# Patient Record
Sex: MALE | Race: WHITE | HISPANIC OR LATINO
[De-identification: names, ages, dates, MRNs, and addresses within clinical notes are randomized per-mention and may not be internally consistent; named-entity substitution may affect disease eponyms.]

---

## 2019-07-10 PROBLEM — Z00.00 ENCOUNTER FOR PREVENTIVE HEALTH EXAMINATION: Status: ACTIVE | Noted: 2019-07-10

## 2019-07-15 ENCOUNTER — APPOINTMENT (OUTPATIENT)
Dept: OTOLARYNGOLOGY | Facility: CLINIC | Age: 49
End: 2019-07-15
Payer: COMMERCIAL

## 2019-07-15 DIAGNOSIS — J34.9 UNSPECIFIED DISORDER OF NOSE AND NASAL SINUSES: ICD-10-CM

## 2019-07-15 DIAGNOSIS — J32.4 CHRONIC PANSINUSITIS: ICD-10-CM

## 2019-07-15 DIAGNOSIS — H61.20 IMPACTED CERUMEN, UNSPECIFIED EAR: ICD-10-CM

## 2019-07-15 PROCEDURE — 99203 OFFICE O/P NEW LOW 30 MIN: CPT | Mod: 25

## 2019-07-15 PROCEDURE — 69210 REMOVE IMPACTED EAR WAX UNI: CPT

## 2019-07-15 PROCEDURE — 31231 NASAL ENDOSCOPY DX: CPT

## 2019-07-15 RX ORDER — PSEUDOEPHEDRINE HCL 30 MG
TABLET ORAL
Refills: 0 | Status: ACTIVE | COMMUNITY

## 2019-07-15 RX ORDER — FLUTICASONE PROPIONATE 50 MCG
SPRAY, SUSPENSION NASAL
Refills: 0 | Status: ACTIVE | COMMUNITY

## 2019-07-15 NOTE — PROCEDURE
[FreeTextEntry6] : PROCEDURE NOTES\par \par PROCEDURE: Nasal endoscopy \par SURGEON: Dr. Garcia\par INDICATIONS: Assess for chronic sinusitis. \par ANESTHESIA: The patient was placed in a sitting position.  Following application of the topical anesthetic and decongestant, exam was performed with a zero degree endoscope.  The scope was passed along the right nasal floor to the nasopharynx.  It was then passed into the region of the middle meatus, middle turbinate, and sphenoethmoid region.  An identical procedure was performed on the left side.  The following findings were noted:\par \par The nasal mucosa was healthy appearing and the septum was roughly midline. The middle meatus and sphenoethmoid recesses were clear bilaterally. The nasopharynx was normal. \par  \par scant mucopus--  cultured in right middle meatus.

## 2019-07-15 NOTE — HISTORY OF PRESENT ILLNESS
[de-identified] : This gentleman is known to me.\par He reports that he develop sounds like an upper respiratory tract infection several weeks ago. The illness ran its course but he now has a cough.\par He also has a lot of throat clearing.\par He is using Flonase and allergy medication.\par \par He does feel that after balloon sinuplasty his illnesses are less frequent and less severe.\par \par He also complains of beer itching on the left.

## 2019-07-15 NOTE — ASSESSMENT
[FreeTextEntry1] : \par He has exacerbation of LPR after a URI. He will go back taking omeprazole.\par He will also be diligent in adjusting diet and lifestyle modifications for RPR.\par I will follow up as culture next week to decide if directed therapy is necessary.\par \par \par \par \par \par \par -Findings were reviewed and discussed with the patient in detail\par -Good aural hygiene reviewed\par -Patient may use wax removal drops as needed.

## 2019-07-22 LAB — BACTERIA FLD CULT: ABNORMAL

## 2022-12-12 ENCOUNTER — NON-APPOINTMENT (OUTPATIENT)
Age: 52
End: 2022-12-12

## 2022-12-13 ENCOUNTER — APPOINTMENT (OUTPATIENT)
Dept: OTOLARYNGOLOGY | Facility: CLINIC | Age: 52
End: 2022-12-13

## 2022-12-13 DIAGNOSIS — R05.9 COUGH, UNSPECIFIED: ICD-10-CM

## 2022-12-13 PROCEDURE — 99204 OFFICE O/P NEW MOD 45 MIN: CPT | Mod: 25

## 2022-12-13 PROCEDURE — 31575 DIAGNOSTIC LARYNGOSCOPY: CPT

## 2022-12-13 RX ORDER — OMEPRAZOLE 20 MG/1
TABLET, DELAYED RELEASE ORAL
Refills: 0 | Status: DISCONTINUED | COMMUNITY
End: 2022-12-13

## 2022-12-13 RX ORDER — BENZONATATE 100 MG/1
100 CAPSULE ORAL
Qty: 30 | Refills: 0 | Status: ACTIVE | COMMUNITY
Start: 2022-12-13 | End: 1900-01-01

## 2022-12-13 RX ORDER — IPRATROPIUM BROMIDE 42 UG/1
0.06 SPRAY NASAL 3 TIMES DAILY
Qty: 1 | Refills: 1 | Status: ACTIVE | COMMUNITY
Start: 2022-12-13 | End: 1900-01-01

## 2022-12-13 RX ORDER — OMEPRAZOLE 40 MG/1
40 CAPSULE, DELAYED RELEASE ORAL
Qty: 30 | Refills: 3 | Status: ACTIVE | COMMUNITY
Start: 2022-12-13 | End: 1900-01-01

## 2022-12-19 NOTE — PROCEDURE
[FreeTextEntry6] : PROCEDURE NOTES\par \par PROCEDURE: Nasal endoscopy \par SURGEON: Dr. Garcia\par INDICATIONS: Assess for chronic sinusitis. \par ANESTHESIA: The patient was placed in a sitting position.  Following application of the topical anesthetic and decongestant, exam was performed with a zero degree endoscope.  The scope was passed along the right nasal floor to the nasopharynx.  It was then passed into the region of the middle meatus, middle turbinate, and sphenoethmoid region.  An identical procedure was performed on the left side.  The following findings were noted:\par \par The nasal mucosa was healthy appearing and the septum was roughly midline. The middle meatus and sphenoethmoid recesses were clear bilaterally. The nasopharynx was normal. \par   [de-identified] : PROCEDURE: Flexible laryngoscopy\par SURGEON: Dr. Garcia\par INDICATIONS: He was unable to tolerate a mirror exam. Assess for laryngopharynx pharyngeal reflux. cough. head and neck mass. \par ANESTHESIA: The patient was placed in a sitting position.  Following application of the topical anesthetic and decongestant, exam was performed with a flexible scope.  The scope was passed along the right nasal floor to the nasopharynx.  It was then passed into the region of the middle meatus, middle turbinate, and sphenoethmoid region.  An identical procedure was performed on the left side.  The following findings were noted:\par \par The nasal musoca was healthy appearing and the septum was roughly midline. The middle meatus and sphenoethmoid recesses were clear bilaterally. The nasopharynx \par \par Nasopharynx: no masses, choanae patent, no adenoid tissue\par \par Base of tongue/vallecula: no masses or asymmetry\par Pharyngeal walls: symmetrical. No masses.\par Pyriform sinuses: no lesions or pooling of secretions.\par Epiglottis: normal. No edema or lesions.\par Aryepiglottic folds: normal. No lesions. \par Vocal cords: clear and mobile. No lesions. Airway patent.\par Arytenoids: no edema or erythema. \par Interarytenoid area: no edema, erythema or lesion.\par   immune

## 2022-12-19 NOTE — ASSESSMENT
[FreeTextEntry1] : Idiopathic cough.\par It is possible with a known history of LPR that a recent infection is exacerbated LPR.\par I am going to give him a trial of omeprazole.\par Benzoate.\par Atrovent.\par Follow-up pending his clinical course

## 2022-12-19 NOTE — HISTORY OF PRESENT ILLNESS
[de-identified] : I have not seen this gentleman in over 3 years.\par His chief complaint is "cough for 5 weeks with postnasal drip.  He is describing an unremitting cough despite aggressive therapy.\par He does not have hemoptysis.\par He feels that it is related to his sinuses.

## 2024-04-03 ENCOUNTER — NON-APPOINTMENT (OUTPATIENT)
Age: 54
End: 2024-04-03

## 2024-04-04 ENCOUNTER — APPOINTMENT (OUTPATIENT)
Dept: OTOLARYNGOLOGY | Facility: CLINIC | Age: 54
End: 2024-04-04
Payer: COMMERCIAL

## 2024-04-04 DIAGNOSIS — R05.9 COUGH, UNSPECIFIED: ICD-10-CM

## 2024-04-04 PROCEDURE — 31231 NASAL ENDOSCOPY DX: CPT

## 2024-04-04 PROCEDURE — 99213 OFFICE O/P EST LOW 20 MIN: CPT | Mod: 25

## 2024-04-04 RX ORDER — AMOXICILLIN AND CLAVULANATE POTASSIUM 875; 125 MG/1; MG/1
875-125 TABLET, COATED ORAL
Qty: 20 | Refills: 0 | Status: ACTIVE | COMMUNITY
Start: 2024-04-04 | End: 1900-01-01

## 2024-04-08 PROBLEM — R05.9 COUGH: Status: ACTIVE | Noted: 2019-07-15

## 2024-04-08 NOTE — ASSESSMENT
[FreeTextEntry1] : Sinobronchitis with exacerbation of LPR. Will treat him with a broad-spectrum antibiotic. Also recommend he use strict reflux diet as well as his medication. Follow-up in 2 to 3 weeks.

## 2024-04-08 NOTE — HISTORY OF PRESENT ILLNESS
[de-identified] : Well-known to me.  Have not seen him in some time. Presents for what he feels is a sinus infection. Having significant postnasal drip with discolored mucus. Exacerbation of his cough.

## 2024-04-08 NOTE — PROCEDURE
[FreeTextEntry6] : PROCEDURE NOTES  PROCEDURE: Nasal endoscopy  SURGEON: Dr. Garcia INDICATIONS: Assess for chronic sinusitis.  ANESTHESIA: The patient was placed in a sitting position.  Following application of the topical anesthetic and decongestant, exam was performed with a zero degree endoscope.  The scope was passed along the right nasal floor to the nasopharynx.  It was then passed into the region of the middle meatus, middle turbinate, and sphenoethmoid region.  An identical procedure was performed on the left side.  The following findings were noted:  The nasal mucosa was healthy appearing and the septum was roughly midline. The middle meatus and sphenoethmoid recesses were clear bilaterally. The Superior meatus was without lesion or infection.  The Inferior, Middle and Superior Turbinates were not enlarged and healthy in appearance.  There was not pus, polyps or mass.  The nasopharynx was normal.    Pus from his left middle meatus.

## 2024-04-30 ENCOUNTER — APPOINTMENT (OUTPATIENT)
Dept: OTOLARYNGOLOGY | Facility: CLINIC | Age: 54
End: 2024-04-30

## 2024-05-17 ENCOUNTER — APPOINTMENT (OUTPATIENT)
Dept: OTOLARYNGOLOGY | Facility: CLINIC | Age: 54
End: 2024-05-17
Payer: COMMERCIAL

## 2024-05-17 DIAGNOSIS — K21.9 GASTRO-ESOPHAGEAL REFLUX DISEASE W/OUT ESOPHAGITIS: ICD-10-CM

## 2024-05-17 PROCEDURE — 99212 OFFICE O/P EST SF 10 MIN: CPT

## 2024-05-17 RX ORDER — OMEPRAZOLE 40 MG/1
40 CAPSULE, DELAYED RELEASE ORAL
Qty: 30 | Refills: 1 | Status: ACTIVE | COMMUNITY
Start: 2024-05-17 | End: 1900-01-01

## 2024-07-02 PROBLEM — K21.9 LPRD (LARYNGOPHARYNGEAL REFLUX DISEASE): Status: ACTIVE | Noted: 2024-07-02

## 2024-07-29 ENCOUNTER — RX RENEWAL (OUTPATIENT)
Age: 54
End: 2024-07-29